# Patient Record
Sex: FEMALE | Race: WHITE
[De-identification: names, ages, dates, MRNs, and addresses within clinical notes are randomized per-mention and may not be internally consistent; named-entity substitution may affect disease eponyms.]

---

## 2018-03-22 ENCOUNTER — HOSPITAL ENCOUNTER (OUTPATIENT)
Dept: HOSPITAL 47 - RADMAMWWP | Age: 58
Discharge: HOME | End: 2018-03-22
Payer: COMMERCIAL

## 2018-03-22 DIAGNOSIS — Z12.31: Primary | ICD-10-CM

## 2018-03-22 PROCEDURE — 77067 SCR MAMMO BI INCL CAD: CPT

## 2018-03-22 NOTE — MM
Reason for exam: screening  (asymptomatic).

Last mammogram was performed 6 years and 5 months ago.



Physical Findings:

A clinical breast exam by your physician is recommended on an annual basis and 

results should be correlated with mammographic findings.



MG Screening Mammo w CAD

Bilateral CC and MLO view(s) were taken.

Prior study comparison: October 7, 2011, bilateral digital screening mammo w/CAD. 

February 22, 2010, bilateral digital screening mammogram.

The breast tissue is heterogeneously dense. This may lower the sensitivity of 

mammography.  There is no discrete abnormality.  No significant changes when 

compared with prior studies.





ASSESSMENT: Negative, BI-RAD 1



RECOMMENDATION:

Routine screening mammogram of both breasts in 1 year.

## 2018-09-19 ENCOUNTER — HOSPITAL ENCOUNTER (OUTPATIENT)
Dept: HOSPITAL 47 - RADNMMAIN | Age: 58
Discharge: HOME | End: 2018-09-19
Attending: FAMILY MEDICINE
Payer: COMMERCIAL

## 2018-09-19 DIAGNOSIS — R94.31: Primary | ICD-10-CM

## 2018-09-19 PROCEDURE — 93351 STRESS TTE COMPLETE: CPT

## 2018-09-19 NOTE — P.STRESS
- Stress Test Note


Stress Test Results/Findings: 


Exam Performed:  stress echo exercise with con


Exam Date: 09/19/18


Reason for Exam: ABNORMAL EKG





Height: 5 ft 1 in


Weight: 51.71 kg


Protocol: BENJAMIN


Stage: 4


Duration of Exercise: 11:00





Resting Heart Rate: 80


Resting Blood Pressure: 93/57


Maximum Achieved Heart Rate: 145


Maximum Achieved Blood Pressure: 141/66


85% PMHR: 139


100% PMHR: 164


METS: 12.1


Technologist Comment: 





Stress Test Results/Findings: 


This is a [age]-year-old female with history of smoking being evaluated because 

of abnormal EKG.





Stress data: Baseline EKG showed sinus rhythm with evidence of frequent 

premature ventricular contractions and T-wave inversion in aVL.  Blood pressure 

at rest is 93/57 with pulse rate of 80.  Patient walked on the Benjamin protocol 

for 11 minutes achieving a maximal heart rate of 145 with blood pressure 141/

66.  EKGs taken during and after the exercise did not reveal any significant 

changes from the baseline.





Echo data: Baseline echo images show normal wall motion and thickening.  

Exercise echo images showed augmentation of wall motion and thickening in all 

the segments.





Final impression: #1.  Negative stress test #2.  Negative stress echo.

## 2018-09-20 NOTE — ECHOS
Stress Test Results/Findings: 

Exam Performed:  stress echo exercise with con

Exam Date: 09/19/18

Reason for Exam: ABNORMAL EKG



Height: 5 ft 1 in

Weight: 51.71 kg

Protocol: BENJAMIN

Stage: 4

Duration of Exercise: 11:00



Resting Heart Rate: 80

Resting Blood Pressure: 93/57

Maximum Achieved Heart Rate: 145

Maximum Achieved Blood Pressure: 141/66

85% PMHR: 139

100% PMHR: 164

METS: 12.1

Technologist Comment: 



Stress Test Results/Findings: 

This is a [age]-year-old female with history of smoking being evaluated because 
of abnormal EKG.



Stress data: Baseline EKG showed sinus rhythm with evidence of frequent 
premature ventricular contractions and T-wave inversion in aVL.  Blood pressure 
at rest is 93/57 with pulse rate of 80.  Patient walked on the Benjamin protocol 
for 11 minutes achieving a maximal heart rate of 145 with blood pressure 141/
66.  EKGs taken during and after the exercise did not reveal any significant 
changes from the baseline.



Echo data: Baseline echo images show normal wall motion and thickening.  
Exercise echo images showed augmentation of wall motion and thickening in all 
the segments.



Final impression: #1.  Negative stress test #2.  Negative stress echo.

JITENDRA